# Patient Record
Sex: MALE
[De-identification: names, ages, dates, MRNs, and addresses within clinical notes are randomized per-mention and may not be internally consistent; named-entity substitution may affect disease eponyms.]

---
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
## 2024-03-25 NOTE — PHYSICAL EXAM
[NL] : regular rate and rhythm, normal S1, S2 audible, no murmurs [de-identified] : wearing cast on right hand no